# Patient Record
Sex: FEMALE | Race: WHITE | NOT HISPANIC OR LATINO | Employment: FULL TIME | URBAN - METROPOLITAN AREA
[De-identification: names, ages, dates, MRNs, and addresses within clinical notes are randomized per-mention and may not be internally consistent; named-entity substitution may affect disease eponyms.]

---

## 2017-08-11 DIAGNOSIS — Z12.31 ENCOUNTER FOR SCREENING MAMMOGRAM FOR MALIGNANT NEOPLASM OF BREAST: ICD-10-CM

## 2017-08-21 ENCOUNTER — ALLSCRIPTS OFFICE VISIT (OUTPATIENT)
Dept: OTHER | Facility: OTHER | Age: 47
End: 2017-08-21

## 2017-08-25 ENCOUNTER — LAB CONVERSION - ENCOUNTER (OUTPATIENT)
Dept: OTHER | Facility: OTHER | Age: 47
End: 2017-08-25

## 2017-08-25 LAB
ADDITIONAL INFORMATION (HISTORICAL): NORMAL
ADEQUACY: (HISTORICAL): NORMAL
COMMENT (HISTORICAL): NORMAL
CYTOTECHNOLOGIST: (HISTORICAL): NORMAL
INTERPRETATION (HISTORICAL): NORMAL
LMP (HISTORICAL): NORMAL
PREV. BX: (HISTORICAL): NORMAL
PREV. PAP (HISTORICAL): NORMAL
REVIEWED BY (HISTORICAL): NORMAL
SOURCE (HISTORICAL): NORMAL

## 2017-08-29 ENCOUNTER — GENERIC CONVERSION - ENCOUNTER (OUTPATIENT)
Dept: OTHER | Facility: OTHER | Age: 47
End: 2017-08-29

## 2018-01-13 VITALS
WEIGHT: 161.38 LBS | BODY MASS INDEX: 28.59 KG/M2 | DIASTOLIC BLOOD PRESSURE: 80 MMHG | HEIGHT: 63 IN | SYSTOLIC BLOOD PRESSURE: 148 MMHG

## 2018-01-15 NOTE — RESULT NOTES
Verified Results  MAMMO SCREENING BILATERAL W 3D & CAD [id number] 58:54HN Leela Reason Order Number: [id number]     Test Name Result Flag Reference   MAMMO SCREENING BILATERAL W 3D & CAD (Report)     Patient History:   Family history of breast cancer in maternal unspecified  Benign WBUS guided breast biopsy of the left breast, January 28, 2015  Pathology Report, January 28, 2015  Taking hormonal contraceptives for 8 years  Patient has never smoked  Patient's BMI is 26 3  Reason for exam: screening (asymptomatic)  Mammo Screening Bilateral W DBT and CAD: April 9, 2016 - Check In   #: [de-identified]   2D/3D Procedure   3D views: Bilateral MLO view(s) were taken  2D views: Bilateral CC and XCCL view(s) were taken  Technologist: TEJA Bui (TEJA)(M)   Prior study comparison: January 28, 2015, left breast unilateral    limited RBC UP, performed at [address] [place] [address]  December 1, 2014, bilateral ultrasound, performed at [facility]  December 1, 2014, bilateral    mammogram, performed at [facility]  November 13, 2014, bilateral mammogram, performed at [address] [facility]  November 7, 2013, bilateral    mammogram, performed at [facility]  October 17, 2012, bilateral mammogram, performed at [address] [facility]  October 11, 2011, bilateral    mammogram, performed at [facility]  The breast tissue is heterogeneously dense, potentially limiting    the sensitivity of mammography  Patient risk, included in this    report, assists in determining the appropriate screening regimen    (such as 3-D mammography or the inclusion of automated breast    ultrasound or MRI)  3-D mammography may also remain indicated as    screening   A combination of mediolateral oblique 3-D tomographic   slices as well as standard two-dimensional orthogonal images    were obtained  No dominant soft tissue mass, architectural distortion or    suspicious calcifications are noted  The skin and nipple    structures are within normal limits  Scattered benign appearing    calcifications are noted  No mammographic evidence of malignancy  No    significant changes when compared with prior studies  ASSESSMENT: BiRad:2 - Benign     Recommendation:   Routine screening mammogram of both breasts in 1 year  A    reminder letter will be sent  8-10% of cancers will be missed on mammography  Management of a    palpable abnormality must be based on clinical grounds  Patients    will be notified of their results via letter from our facility  Accredited by [employer] of [employer] and FDA  Transcription Location: TEJA Pizarro 98: [id number]     Risk Value(s):   Tyrer-Cuzick 10 Year: 2 984%, Tyrer-Cuzick Lifetime: 15 255%,    Myriad Table: 1 5%, DAVID 5 Year: 1 0%, NCI Lifetime: 9 3%   Signed by: Jacque Mccain MD   4/11/16     THINPREP TIS AND HPV mRNA E6/E7 [id number] 19:66WR Lavonne Scanlon     Test Name Result Flag Reference   CLINICAL INFORMATION:      Routine exam   LMP:      3/11/16   PREV  PAP:      NONE GIVEN   PREV  BX:      NONE GIVEN   SOURCE:      Cervix, Endocervix   STATEMENT OF ADEQUACY:      Satisfactory for evaluation  Endocervical/transformation zone component  present  INTERPRETATION/RESULT:      Negative for intraepithelial lesion or malignancy  COMMENT:      This Pap test has been evaluated with computer  assisted technology  CYTOTECHNOLOGIST:      RAMON Engel(ASCP)  CT screening location: [address], [facility]   HPV mRNA E6/E7 Detected A Not Detected   This test was performed using the APTIMA HPV Assay ([employer] )  This assay detects E6/E7 viral messenger RNA (mRNA) from 14  high-risk HPV types (16,18,31,33,35,39,45,51,52,56,58,59,66,68)       THINPREP TIS AND HPV mRNA E6/E7 [id number] 25:07RK Rayma Roys     Test Name Result Flag Reference   CLINICAL INFORMATION:      Routine exam   LMP:      3/11/16   PREV  PAP:      NONE GIVEN   PREV  BX:      NONE GIVEN   SOURCE:      Cervix, Endocervix   STATEMENT OF ADEQUACY:      Satisfactory for evaluation  Endocervical/transformation zone component  present  INTERPRETATION/RESULT:      Negative for intraepithelial lesion or malignancy  COMMENT:      This Pap test has been evaluated with computer  assisted technology  CYTOTECHNOLOGIST:      RAMON Davila(ASCP)  CT screening location: [address] [facility], [facility]   HPV mRNA E6/E7 Detected A Not Detected   This test was performed using the APTIMA HPV Assay ([employer] )  This assay detects E6/E7 viral messenger RNA (mRNA) from 14  high-risk HPV types (16,18,31,33,35,39,45,51,52,56,58,59,66,68)  (Q) TEST AUTHORIZATION [id number] 92:58OV Rayma Roys     Test Name Result Flag Reference   TEST(S) ORDERED ON$REQUISITION      HPV GENOTYPING 16 18 45   TEST CODE: [id number]     CLIENT CONTACT: WALLY     REPORT ALWAYS MESSAGE$SIGNATURE See Below     The laboratory testing on this patient was verbally requested  or confirmed by the ordering physician or his or her authorized  representative after contact with an employee of [employer]  Federal regulations require that we maintain on file written  authorization for all laboratory testing  Accordingly we are asking  that the ordering physician or his or her authorized representative  sign a copy of this report and promptly return it to the client    Signature:____________________________________________________     THINPREP TIS AND HPV mRNA E6/E7 [id number] 68:61XA Rayma Roys     Test Name Result Flag Reference   CLINICAL INFORMATION:      Routine exam   LMP:      3/11/16   PREV  PAP:      NONE GIVEN   PREV   BX:      NONE GIVEN   SOURCE:      Cervix, Endocervix STATEMENT OF ADEQUACY:      Satisfactory for evaluation  Endocervical/transformation zone component  present  INTERPRETATION/RESULT:      Negative for intraepithelial lesion or malignancy  COMMENT:      This Pap test has been evaluated with computer  assisted technology  CYTOTECHNOLOGIST:      RAMON Friend(ASCP)  CT screening location: [address], [address]   HPV mRNA E6/E7 Detected A Not Detected   This test was performed using the APTIMA HPV Assay ([employer] )  This assay detects E6/E7 viral messenger RNA (mRNA) from 14  high-risk HPV types (16,18,31,33,35,39,45,51,52,56,58,59,66,68)  HPV GENOTYPES 16,18/45 [id number] 82:15TF Jossy Brito     Test Name Result Flag Reference   HPV 16 RNA NOT DETECTED  NOT DETECTED   HPV 18/45 RNA NOT DETECTED  NOT DETECTED   This test was performed using the APTIMA HPV 16 18/45  genotype assay ([address] )  The assay can   differentiate HPV 16 from HPV 18 and/or HPV 45, but   does not differentiate between HPV 18 and HPV 45      (Q) TEST AUTHORIZATION [id number] 22:79OG Jossy Ngs     Test Name Result Flag Reference   TEST(S) ORDERED ON$REQUISITION      HPV GENOTYPING 16 18 45   TEST CODE: [id number]     CLIENT CONTACT: WALLY     REPORT ALWAYS MESSAGE$SIGNATURE See Below     The laboratory testing on this patient was verbally requested  or confirmed by the ordering physician or his or her authorized  representative after contact with an employee of [facility]  Federal regulations require that we maintain on file written  authorization for all laboratory testing  Accordingly we are asking  that the ordering physician or his or her authorized representative  sign a copy of this report and promptly return it to the client            Signature:____________________________________________________

## 2018-01-15 NOTE — RESULT NOTES
Verified Results  THINPREP TIS PAP REFLEX HPV mRNA E6/E7 [id number] 50:91VS Jasvir Comment     Test Name Result Flag Reference   CLINICAL INFORMATION:      Routine exam   LMP:      [id number]   FHFV  PAP:      NONE GIVEN   PREV  BX:      NONE GIVEN   SOURCE:      Cervix, Endocervix   STATEMENT OF ADEQUACY:      Satisfactory for evaluation  Endocervical/transformation zone component  present  INTERPRETATION/RESULT:      Negative for intraepithelial lesion or malignancy  COMMENT:      This Pap test has been evaluated with computer  assisted technology     CYTOTECHNOLOGIST:      RAMON Smith(ASCP)  CT screening location: [address] [place] [address], [address] [place] [address]   REVIEW CYTOTECHNOLOGIST:      RAMON ALBA(ASCP)  CT screening location: [address],  [address] [place] [address]

## 2022-04-14 ENCOUNTER — TELEPHONE (OUTPATIENT)
Dept: NEUROLOGY | Facility: CLINIC | Age: 52
End: 2022-04-14

## 2022-04-21 NOTE — TELEPHONE ENCOUNTER
Per Triage:    Desiree Garrido, DO Tae Hui MD; Nathaly Miguel; Abby Arredondo MD  Cc: Andrew Botello; Bessie Lomeli do not have any experience with CLIPPERS and do not feel comfortable seeing the patient  Also this is my last week in the office              Previous Messages       ----- Message -----   From: Tae Hui MD   Sent: 4/14/2022   4:08 PM EDT   To: Thaddeus White MD, *   Subject: RE: Clippers Syndrome Triage                     Sherren Hoop -     Unfortunately, I do not have any experience with patient who diagnosed with CLIPPERS  We may ask Dr Selene Amezcua and Dr Hong Gay or our new General neurologist form [facility] to evaluate the patient for CLIPPERS  Patient should bring CDs to any office to review her imaging first to have any further recommendations or scheduling decisions  I am not accepting the patient till MRIs are uploaded and available for review  Dr Selene Amezcua - any advise? 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

## 2022-04-21 NOTE — TELEPHONE ENCOUNTER
Called patient lmom for her to take any of her imaging disc from any scans she's had done to our nearest care [facility] or [facility] to ask them to scan the images into her chart  I left call back number for her to call us to let us know if she received this message

## 2022-05-03 NOTE — TELEPHONE ENCOUNTER
Spoke with the pt today  She claimed that [facility] [place] offer her 2 option to schedule appointment and only wants to talk to [place]  I advised the pt that we will give her a call back